# Patient Record
Sex: FEMALE | Race: WHITE | NOT HISPANIC OR LATINO | Employment: OTHER | URBAN - METROPOLITAN AREA
[De-identification: names, ages, dates, MRNs, and addresses within clinical notes are randomized per-mention and may not be internally consistent; named-entity substitution may affect disease eponyms.]

---

## 2023-08-28 LAB
ALANINE AMINOTRANSFERASE (SGPT) (U/L) IN SER/PLAS: 13 U/L (ref 7–45)
ALBUMIN (G/DL) IN SER/PLAS: 4.3 G/DL (ref 3.4–5)
ALKALINE PHOSPHATASE (U/L) IN SER/PLAS: 103 U/L (ref 33–136)
ANION GAP IN SER/PLAS: 13 MMOL/L (ref 10–20)
ASPARTATE AMINOTRANSFERASE (SGOT) (U/L) IN SER/PLAS: 19 U/L (ref 9–39)
BASOPHILS (10*3/UL) IN BLOOD BY AUTOMATED COUNT: 0.05 X10E9/L (ref 0–0.1)
BASOPHILS/100 LEUKOCYTES IN BLOOD BY AUTOMATED COUNT: 0.9 % (ref 0–2)
BILIRUBIN TOTAL (MG/DL) IN SER/PLAS: 0.6 MG/DL (ref 0–1.2)
CALCIDIOL (25 OH VITAMIN D3) (NG/ML) IN SER/PLAS: 34 NG/ML
CALCIUM (MG/DL) IN SER/PLAS: 9.5 MG/DL (ref 8.6–10.6)
CARBON DIOXIDE, TOTAL (MMOL/L) IN SER/PLAS: 26 MMOL/L (ref 21–32)
CHLORIDE (MMOL/L) IN SER/PLAS: 105 MMOL/L (ref 98–107)
CHOLESTEROL (MG/DL) IN SER/PLAS: 160 MG/DL (ref 0–199)
CHOLESTEROL IN HDL (MG/DL) IN SER/PLAS: 53.7 MG/DL
CHOLESTEROL/HDL RATIO: 3
CREATININE (MG/DL) IN SER/PLAS: 0.71 MG/DL (ref 0.5–1.05)
EOSINOPHILS (10*3/UL) IN BLOOD BY AUTOMATED COUNT: 0.24 X10E9/L (ref 0–0.4)
EOSINOPHILS/100 LEUKOCYTES IN BLOOD BY AUTOMATED COUNT: 4.2 % (ref 0–6)
ERYTHROCYTE DISTRIBUTION WIDTH (RATIO) BY AUTOMATED COUNT: 12.1 % (ref 11.5–14.5)
ERYTHROCYTE MEAN CORPUSCULAR HEMOGLOBIN CONCENTRATION (G/DL) BY AUTOMATED: 32.1 G/DL (ref 32–36)
ERYTHROCYTE MEAN CORPUSCULAR VOLUME (FL) BY AUTOMATED COUNT: 99 FL (ref 80–100)
ERYTHROCYTES (10*6/UL) IN BLOOD BY AUTOMATED COUNT: 4.49 X10E12/L (ref 4–5.2)
ESTIMATED AVERAGE GLUCOSE FOR HBA1C: 123 MG/DL
GFR FEMALE: 84 ML/MIN/1.73M2
GLUCOSE (MG/DL) IN SER/PLAS: 100 MG/DL (ref 74–99)
HEMATOCRIT (%) IN BLOOD BY AUTOMATED COUNT: 44.6 % (ref 36–46)
HEMOGLOBIN (G/DL) IN BLOOD: 14.3 G/DL (ref 12–16)
HEMOGLOBIN A1C/HEMOGLOBIN TOTAL IN BLOOD: 5.9 %
IMMATURE GRANULOCYTES/100 LEUKOCYTES IN BLOOD BY AUTOMATED COUNT: 0.2 % (ref 0–0.9)
LDL: 87 MG/DL (ref 0–99)
LEUKOCYTES (10*3/UL) IN BLOOD BY AUTOMATED COUNT: 5.8 X10E9/L (ref 4.4–11.3)
LYMPHOCYTES (10*3/UL) IN BLOOD BY AUTOMATED COUNT: 1.48 X10E9/L (ref 0.8–3)
LYMPHOCYTES/100 LEUKOCYTES IN BLOOD BY AUTOMATED COUNT: 25.6 % (ref 13–44)
MONOCYTES (10*3/UL) IN BLOOD BY AUTOMATED COUNT: 0.49 X10E9/L (ref 0.05–0.8)
MONOCYTES/100 LEUKOCYTES IN BLOOD BY AUTOMATED COUNT: 8.5 % (ref 2–10)
NEUTROPHILS (10*3/UL) IN BLOOD BY AUTOMATED COUNT: 3.5 X10E9/L (ref 1.6–5.5)
NEUTROPHILS/100 LEUKOCYTES IN BLOOD BY AUTOMATED COUNT: 60.6 % (ref 40–80)
NRBC (PER 100 WBCS) BY AUTOMATED COUNT: 0 /100 WBC (ref 0–0)
PLATELETS (10*3/UL) IN BLOOD AUTOMATED COUNT: 173 X10E9/L (ref 150–450)
POTASSIUM (MMOL/L) IN SER/PLAS: 4.3 MMOL/L (ref 3.5–5.3)
PROTEIN TOTAL: 7 G/DL (ref 6.4–8.2)
SODIUM (MMOL/L) IN SER/PLAS: 140 MMOL/L (ref 136–145)
THYROTROPIN (MIU/L) IN SER/PLAS BY DETECTION LIMIT <= 0.05 MIU/L: 1.26 MIU/L (ref 0.44–3.98)
THYROXINE (T4) FREE (NG/DL) IN SER/PLAS: 1.08 NG/DL (ref 0.78–1.48)
TRIGLYCERIDE (MG/DL) IN SER/PLAS: 95 MG/DL (ref 0–149)
UREA NITROGEN (MG/DL) IN SER/PLAS: 18 MG/DL (ref 6–23)
VLDL: 19 MG/DL (ref 0–40)

## 2023-10-09 DIAGNOSIS — H60.543 ECZEMA OF BOTH EXTERNAL EARS: Primary | ICD-10-CM

## 2023-10-09 PROBLEM — D48.5 NEOPLASM OF UNCERTAIN BEHAVIOR OF SKIN: Status: RESOLVED | Noted: 2022-12-22 | Resolved: 2023-10-09

## 2023-10-09 PROBLEM — L98.9 SKIN LESION, SUPERFICIAL: Status: RESOLVED | Noted: 2023-10-09 | Resolved: 2023-10-09

## 2023-10-09 PROBLEM — H52.03 HYPEROPIA OF BOTH EYES WITH ASTIGMATISM AND PRESBYOPIA: Status: ACTIVE | Noted: 2023-10-09

## 2023-10-09 PROBLEM — G89.29 CHRONIC BILATERAL LOW BACK PAIN WITHOUT SCIATICA: Status: ACTIVE | Noted: 2023-10-09

## 2023-10-09 PROBLEM — M19.042 PRIMARY OSTEOARTHRITIS OF BOTH HANDS: Status: ACTIVE | Noted: 2023-10-09

## 2023-10-09 PROBLEM — E78.00 HYPERCHOLESTEREMIA: Status: ACTIVE | Noted: 2023-10-09

## 2023-10-09 PROBLEM — M54.50 CHRONIC BILATERAL LOW BACK PAIN WITHOUT SCIATICA: Status: ACTIVE | Noted: 2023-10-09

## 2023-10-09 PROBLEM — R63.4 WEIGHT LOSS: Status: ACTIVE | Noted: 2023-10-09

## 2023-10-09 PROBLEM — S99.911A INJURY OF RIGHT ANKLE AND FOOT: Status: RESOLVED | Noted: 2023-10-09 | Resolved: 2023-10-09

## 2023-10-09 PROBLEM — E53.8 VITAMIN B 12 DEFICIENCY: Status: ACTIVE | Noted: 2023-10-09

## 2023-10-09 PROBLEM — R79.89 ABNORMAL LFTS (LIVER FUNCTION TESTS): Status: RESOLVED | Noted: 2023-10-09 | Resolved: 2023-10-09

## 2023-10-09 PROBLEM — H93.12 TINNITUS OF LEFT EAR: Status: RESOLVED | Noted: 2023-10-09 | Resolved: 2023-10-09

## 2023-10-09 PROBLEM — H61.23 BILATERAL IMPACTED CERUMEN: Status: RESOLVED | Noted: 2021-05-18 | Resolved: 2023-10-09

## 2023-10-09 PROBLEM — S89.90XA KNEE INJURY: Status: RESOLVED | Noted: 2023-10-09 | Resolved: 2023-10-09

## 2023-10-09 PROBLEM — L85.3 XEROSIS CUTIS: Status: ACTIVE | Noted: 2022-12-22

## 2023-10-09 PROBLEM — H52.203 HYPEROPIA OF BOTH EYES WITH ASTIGMATISM AND PRESBYOPIA: Status: ACTIVE | Noted: 2023-10-09

## 2023-10-09 PROBLEM — S92.244A CLOSED NONDISPLACED FRACTURE OF MEDIAL CUNEIFORM OF RIGHT FOOT: Status: RESOLVED | Noted: 2023-10-09 | Resolved: 2023-10-09

## 2023-10-09 PROBLEM — H81.13 BENIGN PAROXYSMAL POSITIONAL VERTIGO DUE TO BILATERAL VESTIBULAR DISORDER: Status: ACTIVE | Noted: 2023-10-09

## 2023-10-09 PROBLEM — S82.143A FRACTURE OF TIBIAL PLATEAU: Status: RESOLVED | Noted: 2023-10-09 | Resolved: 2023-10-09

## 2023-10-09 PROBLEM — E55.9 VITAMIN D DEFICIENCY: Status: ACTIVE | Noted: 2023-10-09

## 2023-10-09 PROBLEM — D22.4 MELANOCYTIC NEVI OF SCALP AND NECK: Status: ACTIVE | Noted: 2022-12-22

## 2023-10-09 PROBLEM — H62.40 FUNGAL OTITIS EXTERNA: Status: RESOLVED | Noted: 2023-10-09 | Resolved: 2023-10-09

## 2023-10-09 PROBLEM — H60.549 ECZEMA OF EXTERNAL EAR: Status: ACTIVE | Noted: 2023-10-09

## 2023-10-09 PROBLEM — S99.921A INJURY OF RIGHT ANKLE AND FOOT: Status: RESOLVED | Noted: 2023-10-09 | Resolved: 2023-10-09

## 2023-10-09 PROBLEM — H93.A2 SUBJECTIVE PULSATILE TINNITUS OF LEFT EAR: Status: ACTIVE | Noted: 2023-10-09

## 2023-10-09 PROBLEM — L57.0 ACTINIC KERATOSIS: Status: ACTIVE | Noted: 2022-12-22

## 2023-10-09 PROBLEM — B18.2 HEP C W/O COMA, CHRONIC (MULTI): Status: ACTIVE | Noted: 2023-10-09

## 2023-10-09 PROBLEM — M19.90 OSTEOARTHRITIS: Status: ACTIVE | Noted: 2023-10-09

## 2023-10-09 PROBLEM — R73.09 ABNORMAL GLUCOSE: Status: RESOLVED | Noted: 2023-10-09 | Resolved: 2023-10-09

## 2023-10-09 PROBLEM — B36.9 FUNGAL OTITIS EXTERNA: Status: RESOLVED | Noted: 2023-10-09 | Resolved: 2023-10-09

## 2023-10-09 PROBLEM — S93.401A SPRAIN OF RIGHT ANKLE: Status: RESOLVED | Noted: 2023-10-09 | Resolved: 2023-10-09

## 2023-10-09 PROBLEM — R42 DIZZINESS: Status: RESOLVED | Noted: 2023-10-09 | Resolved: 2023-10-09

## 2023-10-09 PROBLEM — L81.4 OTHER MELANIN HYPERPIGMENTATION: Status: ACTIVE | Noted: 2022-12-22

## 2023-10-09 PROBLEM — M15.9 GENERALIZED OSTEOARTHRITIS OF MULTIPLE SITES: Status: ACTIVE | Noted: 2023-10-09

## 2023-10-09 PROBLEM — M81.0 OSTEOPOROSIS: Status: ACTIVE | Noted: 2023-10-09

## 2023-10-09 PROBLEM — H25.12 AGE-RELATED NUCLEAR CATARACT OF LEFT EYE: Status: ACTIVE | Noted: 2023-10-09

## 2023-10-09 PROBLEM — H60.92 LEFT OTITIS EXTERNA: Status: RESOLVED | Noted: 2023-10-09 | Resolved: 2023-10-09

## 2023-10-09 PROBLEM — M19.041 PRIMARY OSTEOARTHRITIS OF BOTH HANDS: Status: ACTIVE | Noted: 2023-10-09

## 2023-10-09 PROBLEM — H52.4 HYPEROPIA OF BOTH EYES WITH ASTIGMATISM AND PRESBYOPIA: Status: ACTIVE | Noted: 2023-10-09

## 2023-10-09 PROBLEM — M16.0 PRIMARY OSTEOARTHRITIS OF BOTH HIPS: Status: ACTIVE | Noted: 2023-10-09

## 2023-10-09 PROBLEM — R53.83 FATIGUE: Status: RESOLVED | Noted: 2023-10-09 | Resolved: 2023-10-09

## 2023-10-09 PROBLEM — H90.3 ASYMMETRICAL SENSORINEURAL HEARING LOSS: Status: ACTIVE | Noted: 2021-05-18

## 2023-10-09 PROBLEM — H61.20 WAX IN EAR: Status: RESOLVED | Noted: 2023-10-09 | Resolved: 2023-10-09

## 2023-10-09 RX ORDER — ACETAMINOPHEN 500 MG
50 TABLET ORAL DAILY
COMMUNITY
Start: 2020-10-06

## 2023-10-09 RX ORDER — BOSWELLIA SERRATA EXTRACT 70 %
POWDER (GRAM) MISCELLANEOUS
COMMUNITY
Start: 2020-10-06

## 2023-10-09 RX ORDER — CLOBETASOL PROPIONATE 0.5 MG/G
CREAM TOPICAL 3 TIMES DAILY
COMMUNITY
Start: 2017-08-29 | End: 2023-10-09 | Stop reason: SDUPTHER

## 2023-10-09 RX ORDER — MOMETASONE FUROATE 1 MG/G
OINTMENT TOPICAL DAILY
COMMUNITY
Start: 2020-01-09

## 2023-10-09 RX ORDER — CLOBETASOL PROPIONATE 0.5 MG/G
CREAM TOPICAL 2 TIMES DAILY
Qty: 15 G | Refills: 3 | Status: SHIPPED | OUTPATIENT
Start: 2023-10-09 | End: 2024-01-07

## 2024-02-22 ENCOUNTER — APPOINTMENT (OUTPATIENT)
Dept: DERMATOLOGY | Facility: CLINIC | Age: 84
End: 2024-02-22
Payer: MEDICARE

## 2024-09-03 ENCOUNTER — TELEPHONE (OUTPATIENT)
Dept: PRIMARY CARE | Facility: CLINIC | Age: 84
End: 2024-09-03
Payer: MEDICARE

## 2024-09-03 DIAGNOSIS — E78.00 HYPERCHOLESTEREMIA: Primary | ICD-10-CM

## 2024-09-03 DIAGNOSIS — R53.83 FATIGUE, UNSPECIFIED TYPE: ICD-10-CM

## 2024-09-03 DIAGNOSIS — E55.9 VITAMIN D DEFICIENCY: ICD-10-CM

## 2024-09-12 DIAGNOSIS — D64.9 ANEMIA, UNSPECIFIED TYPE: Primary | ICD-10-CM

## 2024-09-17 ENCOUNTER — LAB (OUTPATIENT)
Dept: LAB | Facility: LAB | Age: 84
End: 2024-09-17
Payer: MEDICARE

## 2024-09-17 DIAGNOSIS — R53.83 FATIGUE, UNSPECIFIED TYPE: ICD-10-CM

## 2024-09-17 DIAGNOSIS — E55.9 VITAMIN D DEFICIENCY: ICD-10-CM

## 2024-09-17 DIAGNOSIS — D64.9 ANEMIA, UNSPECIFIED TYPE: ICD-10-CM

## 2024-09-17 DIAGNOSIS — E78.00 HYPERCHOLESTEREMIA: ICD-10-CM

## 2024-09-17 LAB
25(OH)D3 SERPL-MCNC: 38 NG/ML (ref 30–100)
ALBUMIN SERPL BCP-MCNC: 4 G/DL (ref 3.4–5)
ALP SERPL-CCNC: 100 U/L (ref 33–136)
ALT SERPL W P-5'-P-CCNC: 12 U/L (ref 7–45)
ANION GAP SERPL CALC-SCNC: 12 MMOL/L (ref 10–20)
AST SERPL W P-5'-P-CCNC: 20 U/L (ref 9–39)
BASOPHILS # BLD AUTO: 0.06 X10*3/UL (ref 0–0.1)
BASOPHILS NFR BLD AUTO: 1 %
BILIRUB SERPL-MCNC: 0.5 MG/DL (ref 0–1.2)
BUN SERPL-MCNC: 18 MG/DL (ref 6–23)
CALCIUM SERPL-MCNC: 9.3 MG/DL (ref 8.6–10.6)
CHLORIDE SERPL-SCNC: 102 MMOL/L (ref 98–107)
CHOLEST SERPL-MCNC: 157 MG/DL (ref 0–199)
CHOLESTEROL/HDL RATIO: 2.5
CO2 SERPL-SCNC: 29 MMOL/L (ref 21–32)
CREAT SERPL-MCNC: 0.8 MG/DL (ref 0.5–1.05)
EGFRCR SERPLBLD CKD-EPI 2021: 73 ML/MIN/1.73M*2
EOSINOPHIL # BLD AUTO: 0.36 X10*3/UL (ref 0–0.4)
EOSINOPHIL NFR BLD AUTO: 5.9 %
ERYTHROCYTE [DISTWIDTH] IN BLOOD BY AUTOMATED COUNT: 12.2 % (ref 11.5–14.5)
GLUCOSE SERPL-MCNC: 96 MG/DL (ref 74–99)
HCT VFR BLD AUTO: 41.3 % (ref 36–46)
HDLC SERPL-MCNC: 63.1 MG/DL
HGB BLD-MCNC: 13.6 G/DL (ref 12–16)
IMM GRANULOCYTES # BLD AUTO: 0.01 X10*3/UL (ref 0–0.5)
IMM GRANULOCYTES NFR BLD AUTO: 0.2 % (ref 0–0.9)
LDLC SERPL CALC-MCNC: 83 MG/DL
LYMPHOCYTES # BLD AUTO: 1.33 X10*3/UL (ref 0.8–3)
LYMPHOCYTES NFR BLD AUTO: 21.9 %
MCH RBC QN AUTO: 32.2 PG (ref 26–34)
MCHC RBC AUTO-ENTMCNC: 32.9 G/DL (ref 32–36)
MCV RBC AUTO: 98 FL (ref 80–100)
MONOCYTES # BLD AUTO: 0.6 X10*3/UL (ref 0.05–0.8)
MONOCYTES NFR BLD AUTO: 9.9 %
NEUTROPHILS # BLD AUTO: 3.7 X10*3/UL (ref 1.6–5.5)
NEUTROPHILS NFR BLD AUTO: 61.1 %
NON HDL CHOLESTEROL: 94 MG/DL (ref 0–149)
NRBC BLD-RTO: 0 /100 WBCS (ref 0–0)
PLATELET # BLD AUTO: 190 X10*3/UL (ref 150–450)
POTASSIUM SERPL-SCNC: 4.4 MMOL/L (ref 3.5–5.3)
PROT SERPL-MCNC: 6.8 G/DL (ref 6.4–8.2)
RBC # BLD AUTO: 4.22 X10*6/UL (ref 4–5.2)
SODIUM SERPL-SCNC: 139 MMOL/L (ref 136–145)
TRIGL SERPL-MCNC: 56 MG/DL (ref 0–149)
VIT B12 SERPL-MCNC: 503 PG/ML (ref 211–911)
VLDL: 11 MG/DL (ref 0–40)
WBC # BLD AUTO: 6.1 X10*3/UL (ref 4.4–11.3)

## 2024-09-17 PROCEDURE — 85025 COMPLETE CBC W/AUTO DIFF WBC: CPT

## 2024-09-17 PROCEDURE — 82306 VITAMIN D 25 HYDROXY: CPT

## 2024-09-17 PROCEDURE — 36415 COLL VENOUS BLD VENIPUNCTURE: CPT

## 2024-09-17 PROCEDURE — 82607 VITAMIN B-12: CPT

## 2024-09-17 PROCEDURE — 80053 COMPREHEN METABOLIC PANEL: CPT

## 2024-09-17 PROCEDURE — 80061 LIPID PANEL: CPT

## 2024-09-30 ENCOUNTER — APPOINTMENT (OUTPATIENT)
Dept: PRIMARY CARE | Facility: CLINIC | Age: 84
End: 2024-09-30
Payer: MEDICARE

## 2024-09-30 VITALS
BODY MASS INDEX: 17.61 KG/M2 | WEIGHT: 109.57 LBS | HEART RATE: 63 BPM | HEIGHT: 66 IN | TEMPERATURE: 97.3 F | DIASTOLIC BLOOD PRESSURE: 71 MMHG | SYSTOLIC BLOOD PRESSURE: 126 MMHG | OXYGEN SATURATION: 98 % | RESPIRATION RATE: 16 BRPM

## 2024-09-30 DIAGNOSIS — Z00.00 MEDICARE ANNUAL WELLNESS VISIT, SUBSEQUENT: Primary | ICD-10-CM

## 2024-09-30 DIAGNOSIS — H61.23 EXCESSIVE EAR WAX, BILATERAL: ICD-10-CM

## 2024-09-30 DIAGNOSIS — Z23 NEED FOR IMMUNIZATION AGAINST INFLUENZA: ICD-10-CM

## 2024-09-30 PROCEDURE — 1126F AMNT PAIN NOTED NONE PRSNT: CPT | Performed by: INTERNAL MEDICINE

## 2024-09-30 PROCEDURE — 1036F TOBACCO NON-USER: CPT | Performed by: INTERNAL MEDICINE

## 2024-09-30 PROCEDURE — 1158F ADVNC CARE PLAN TLK DOCD: CPT | Performed by: INTERNAL MEDICINE

## 2024-09-30 PROCEDURE — 1157F ADVNC CARE PLAN IN RCRD: CPT | Performed by: INTERNAL MEDICINE

## 2024-09-30 PROCEDURE — 1160F RVW MEDS BY RX/DR IN RCRD: CPT | Performed by: INTERNAL MEDICINE

## 2024-09-30 PROCEDURE — 90662 IIV NO PRSV INCREASED AG IM: CPT | Performed by: INTERNAL MEDICINE

## 2024-09-30 PROCEDURE — 1159F MED LIST DOCD IN RCRD: CPT | Performed by: INTERNAL MEDICINE

## 2024-09-30 PROCEDURE — G0439 PPPS, SUBSEQ VISIT: HCPCS | Performed by: INTERNAL MEDICINE

## 2024-09-30 PROCEDURE — G0008 ADMIN INFLUENZA VIRUS VAC: HCPCS | Performed by: INTERNAL MEDICINE

## 2024-09-30 PROCEDURE — 1123F ACP DISCUSS/DSCN MKR DOCD: CPT | Performed by: INTERNAL MEDICINE

## 2024-09-30 PROCEDURE — 1170F FXNL STATUS ASSESSED: CPT | Performed by: INTERNAL MEDICINE

## 2024-09-30 ASSESSMENT — ACTIVITIES OF DAILY LIVING (ADL)
DRESSING: INDEPENDENT
BATHING: INDEPENDENT
DOING_HOUSEWORK: INDEPENDENT
MANAGING_FINANCES: INDEPENDENT
TAKING_MEDICATION: INDEPENDENT
GROCERY_SHOPPING: INDEPENDENT

## 2024-09-30 ASSESSMENT — PAIN SCALES - GENERAL: PAINLEVEL: 0-NO PAIN

## 2024-09-30 ASSESSMENT — PATIENT HEALTH QUESTIONNAIRE - PHQ9
1. LITTLE INTEREST OR PLEASURE IN DOING THINGS: NOT AT ALL
2. FEELING DOWN, DEPRESSED OR HOPELESS: NOT AT ALL
SUM OF ALL RESPONSES TO PHQ9 QUESTIONS 1 AND 2: 0

## 2024-09-30 ASSESSMENT — ENCOUNTER SYMPTOMS
ARTHRALGIAS: 1
OCCASIONAL FEELINGS OF UNSTEADINESS: 0
DEPRESSION: 0
LOSS OF SENSATION IN FEET: 1

## 2024-09-30 NOTE — ASSESSMENT & PLAN NOTE
After instilling hydrogen peroxide in both ears they were lavaged with warm water and wax was removed with the help of curette.

## 2024-09-30 NOTE — PROGRESS NOTES
"Subjective   Patient ID: Jessica Hensley is a 84 y.o. female who presents for Medicare Annual Wellness Visit Subsequent.    Subsequent Medicare wellness visit.  She feels well has no complaints,  except occasional joints pain.  She gained 9 pounds since last year.  She is vegan.  She takes no prescription medications.  She has had recent blood work results were reviewed.             Review of Systems   Musculoskeletal:  Positive for arthralgias.   All other systems reviewed and are negative.      Objective   /71 (BP Location: Left arm, Patient Position: Sitting)   Pulse 63   Temp 36.3 °C (97.3 °F) (Temporal)   Resp 16   Ht 1.665 m (5' 5.55\")   Wt 49.7 kg (109 lb 9.1 oz)   SpO2 98%   BMI 17.93 kg/m²     Physical Exam  Constitutional:       Appearance: Normal appearance.      Comments: Underweight.   HENT:      Head: Normocephalic and atraumatic.      Comments: Bilateral ears wax     Right Ear: External ear normal.      Left Ear: External ear normal.      Mouth/Throat:      Mouth: Mucous membranes are moist.      Pharynx: Oropharynx is clear.   Neck:      Vascular: No carotid bruit.   Cardiovascular:      Rate and Rhythm: Normal rate and regular rhythm.      Heart sounds: No murmur heard.     No gallop.   Pulmonary:      Effort: Pulmonary effort is normal. No respiratory distress.      Breath sounds: No wheezing or rales.   Abdominal:      General: Abdomen is flat.      Palpations: Abdomen is soft.      Hernia: No hernia is present.   Musculoskeletal:         General: No swelling, tenderness, deformity or signs of injury.      Cervical back: No rigidity or tenderness.      Right lower leg: No edema.   Lymphadenopathy:      Cervical: No cervical adenopathy.   Skin:     Coloration: Skin is not jaundiced or pale.      Findings: Lesion present. No bruising, erythema or rash.      Comments: Rt. Hand skin lesion   Neurological:      General: No focal deficit present.      Mental Status: She is oriented to " person, place, and time.      Motor: No weakness.      Coordination: Coordination normal.   Psychiatric:         Mood and Affect: Mood normal.         Behavior: Behavior normal.         Assessment/Plan   Problem List Items Addressed This Visit             ICD-10-CM    Excessive ear wax, bilateral H61.23     After instilling hydrogen peroxide in both ears they were lavaged with warm water and wax was removed with the help of curette.         Relevant Orders    Ear Cerumen Removal    Medicare annual wellness visit, subsequent - Primary Z00.00     She received high-dose influenza vaccine today.  Recommend to have Shingrix vaccine at the local pharmacy.  She has had last colonoscopy in 2018, Cologuard test 2023 was normal.  Has had fasting blood work results reviewed , normal.  Continue healthy diet exercise regularly.          Other Visit Diagnoses         Codes    Need for immunization against influenza     Z23    Relevant Orders    Flu vaccine, trivalent, preservative free, HIGH-DOSE, age 65y+ (Fluzone)

## 2024-09-30 NOTE — ASSESSMENT & PLAN NOTE
She received high-dose influenza vaccine today.  Recommend to have Shingrix vaccine at the local pharmacy.  She has had last colonoscopy in 2018, Cologuard test 2023 was normal.  Has had fasting blood work results reviewed , normal.  Continue healthy diet exercise regularly.

## 2024-10-01 PROBLEM — B18.2 HEP C W/O COMA, CHRONIC (MULTI): Status: RESOLVED | Noted: 2023-10-09 | Resolved: 2024-10-01

## 2024-10-01 PROBLEM — R63.4 WEIGHT LOSS: Status: RESOLVED | Noted: 2023-10-09 | Resolved: 2024-10-01

## 2024-10-01 PROBLEM — M16.0 PRIMARY OSTEOARTHRITIS OF BOTH HIPS: Status: RESOLVED | Noted: 2023-10-09 | Resolved: 2024-10-01

## 2024-10-01 PROBLEM — E55.9 VITAMIN D DEFICIENCY: Status: RESOLVED | Noted: 2023-10-09 | Resolved: 2024-10-01

## 2024-10-01 PROBLEM — H81.13 BENIGN PAROXYSMAL POSITIONAL VERTIGO DUE TO BILATERAL VESTIBULAR DISORDER: Status: RESOLVED | Noted: 2023-10-09 | Resolved: 2024-10-01

## 2024-10-01 PROBLEM — H52.4 HYPEROPIA OF BOTH EYES WITH ASTIGMATISM AND PRESBYOPIA: Status: RESOLVED | Noted: 2023-10-09 | Resolved: 2024-10-01

## 2024-10-01 PROBLEM — M54.50 CHRONIC BILATERAL LOW BACK PAIN WITHOUT SCIATICA: Status: RESOLVED | Noted: 2023-10-09 | Resolved: 2024-10-01

## 2024-10-01 PROBLEM — H25.12 AGE-RELATED NUCLEAR CATARACT OF LEFT EYE: Status: RESOLVED | Noted: 2023-10-09 | Resolved: 2024-10-01

## 2024-10-01 PROBLEM — Z86.19 HISTORY OF HEPATITIS C VIRUS INFECTION: Status: RESOLVED | Noted: 2024-10-01 | Resolved: 2024-10-01

## 2024-10-01 PROBLEM — E78.00 HYPERCHOLESTEREMIA: Status: RESOLVED | Noted: 2023-10-09 | Resolved: 2024-10-01

## 2024-10-01 PROBLEM — R73.03 PREDIABETES: Status: ACTIVE | Noted: 2023-05-24

## 2024-10-01 PROBLEM — H52.03 HYPEROPIA OF BOTH EYES WITH ASTIGMATISM AND PRESBYOPIA: Status: RESOLVED | Noted: 2023-10-09 | Resolved: 2024-10-01

## 2024-10-01 PROBLEM — D22.4 MELANOCYTIC NEVI OF SCALP AND NECK: Status: RESOLVED | Noted: 2022-12-22 | Resolved: 2024-10-01

## 2024-10-01 PROBLEM — E53.8 VITAMIN B 12 DEFICIENCY: Status: RESOLVED | Noted: 2023-10-09 | Resolved: 2024-10-01

## 2024-10-01 PROBLEM — H61.23 EXCESSIVE EAR WAX, BILATERAL: Status: RESOLVED | Noted: 2023-10-09 | Resolved: 2024-10-01

## 2024-10-01 PROBLEM — G62.9 NEUROPATHY: Status: ACTIVE | Noted: 2024-10-01

## 2024-10-01 PROBLEM — L81.9 PIGMENTED SKIN LESION: Status: ACTIVE | Noted: 2024-10-01

## 2024-10-01 PROBLEM — H52.203 HYPEROPIA OF BOTH EYES WITH ASTIGMATISM AND PRESBYOPIA: Status: RESOLVED | Noted: 2023-10-09 | Resolved: 2024-10-01

## 2024-10-01 PROBLEM — G89.29 CHRONIC BILATERAL LOW BACK PAIN WITHOUT SCIATICA: Status: RESOLVED | Noted: 2023-10-09 | Resolved: 2024-10-01

## 2025-03-13 ENCOUNTER — APPOINTMENT (OUTPATIENT)
Dept: DERMATOLOGY | Facility: CLINIC | Age: 85
End: 2025-03-13
Payer: MEDICARE

## 2025-03-13 ASSESSMENT — DERMATOLOGY QUALITY OF LIFE (QOL) ASSESSMENT
RATE HOW EMOTIONALLY BOTHERED YOU ARE BY YOUR SKIN PROBLEM (FOR EXAMPLE, WORRY, EMBARRASSMENT, FRUSTRATION): 0 - NEVER BOTHERED
RATE HOW BOTHERED YOU ARE BY EFFECTS OF YOUR SKIN PROBLEMS ON YOUR ACTIVITIES (EG, GOING OUT, ACCOMPLISHING WHAT YOU WANT, WORK ACTIVITIES OR YOUR RELATIONSHIPS WITH OTHERS): 0 - NEVER BOTHERED
RATE HOW EMOTIONALLY BOTHERED YOU ARE BY YOUR SKIN PROBLEM (FOR EXAMPLE, WORRY, EMBARRASSMENT, FRUSTRATION): 0 - NEVER BOTHERED
RATE HOW BOTHERED YOU ARE BY EFFECTS OF YOUR SKIN PROBLEMS ON YOUR ACTIVITIES (EG, GOING OUT, ACCOMPLISHING WHAT YOU WANT, WORK ACTIVITIES OR YOUR RELATIONSHIPS WITH OTHERS): 0 - NEVER BOTHERED
RATE HOW BOTHERED YOU ARE BY SYMPTOMS OF YOUR SKIN PROBLEM (EG, ITCHING, STINGING BURNING, HURTING OR SKIN IRRITATION): 3
RATE HOW BOTHERED YOU ARE BY SYMPTOMS OF YOUR SKIN PROBLEM (EG, ITCHING, STINGING BURNING, HURTING OR SKIN IRRITATION): 3

## 2025-03-26 ENCOUNTER — TELEPHONE (OUTPATIENT)
Dept: PRIMARY CARE | Facility: CLINIC | Age: 85
End: 2025-03-26

## 2025-03-27 ENCOUNTER — OFFICE VISIT (OUTPATIENT)
Dept: PRIMARY CARE | Facility: CLINIC | Age: 85
End: 2025-03-27
Payer: COMMERCIAL

## 2025-03-27 VITALS
SYSTOLIC BLOOD PRESSURE: 125 MMHG | BODY MASS INDEX: 18.22 KG/M2 | HEART RATE: 72 BPM | WEIGHT: 111.33 LBS | OXYGEN SATURATION: 96 % | DIASTOLIC BLOOD PRESSURE: 63 MMHG | RESPIRATION RATE: 16 BRPM

## 2025-03-27 DIAGNOSIS — M25.50 PAIN IN JOINTS: ICD-10-CM

## 2025-03-27 DIAGNOSIS — R73.03 PREDIABETES: ICD-10-CM

## 2025-03-27 DIAGNOSIS — M79.675 CHRONIC PAIN OF TOES OF BOTH FEET: Primary | ICD-10-CM

## 2025-03-27 DIAGNOSIS — M79.674 CHRONIC PAIN OF TOES OF BOTH FEET: Primary | ICD-10-CM

## 2025-03-27 DIAGNOSIS — I73.9 CLAUDICATION: ICD-10-CM

## 2025-03-27 DIAGNOSIS — G89.29 CHRONIC PAIN OF TOES OF BOTH FEET: Primary | ICD-10-CM

## 2025-03-27 PROCEDURE — 99213 OFFICE O/P EST LOW 20 MIN: CPT | Performed by: INTERNAL MEDICINE

## 2025-03-27 PROCEDURE — 1125F AMNT PAIN NOTED PAIN PRSNT: CPT | Performed by: INTERNAL MEDICINE

## 2025-03-27 PROCEDURE — G2211 COMPLEX E/M VISIT ADD ON: HCPCS | Performed by: INTERNAL MEDICINE

## 2025-03-27 PROCEDURE — 1036F TOBACCO NON-USER: CPT | Performed by: INTERNAL MEDICINE

## 2025-03-27 PROCEDURE — 1159F MED LIST DOCD IN RCRD: CPT | Performed by: INTERNAL MEDICINE

## 2025-03-27 PROCEDURE — 1123F ACP DISCUSS/DSCN MKR DOCD: CPT | Performed by: INTERNAL MEDICINE

## 2025-03-27 PROCEDURE — 1157F ADVNC CARE PLAN IN RCRD: CPT | Performed by: INTERNAL MEDICINE

## 2025-03-27 RX ORDER — MOMETASONE FUROATE 1 MG/G
OINTMENT TOPICAL
COMMUNITY

## 2025-03-27 RX ORDER — CLOBETASOL PROPIONATE 0.5 MG/G
CREAM TOPICAL
COMMUNITY

## 2025-03-27 ASSESSMENT — ENCOUNTER SYMPTOMS
DEPRESSION: 0
LOSS OF SENSATION IN FEET: 1
OCCASIONAL FEELINGS OF UNSTEADINESS: 0

## 2025-03-27 ASSESSMENT — PAIN SCALES - GENERAL: PAINLEVEL_OUTOF10: 3

## 2025-03-27 NOTE — ASSESSMENT & PLAN NOTE
Recommend to see podiatrist in consult to recommend optimal shoes and inserts.  Treating calluses.

## 2025-03-27 NOTE — PROGRESS NOTES
Subjective   Patient ID: Jessica Hensley is a 85 y.o. female who presents for Foot Pain (Bilateral/Redness and warmth).    Same-day appointment.  Complains of bilateral feet pain, turning purple, painful calluses, toe deformities.   Sometimes calves  when walking.         Review of Systems   Musculoskeletal:         Feet problems   All other systems reviewed and are negative.      Objective   /63 (BP Location: Left arm, Patient Position: Sitting)   Pulse 72   Resp 16   Wt 50.5 kg (111 lb 5.3 oz)   SpO2 96%   BMI 18.22 kg/m²     Physical Exam  HENT:      Head: Normocephalic.   Eyes:      Extraocular Movements: Extraocular movements intact.   Cardiovascular:      Rate and Rhythm: Regular rhythm.      Pulses:           Dorsalis pedis pulses are 2+ on the right side and 2+ on the left side.        Posterior tibial pulses are 2+ on the right side and 2+ on the left side.   Pulmonary:      Breath sounds: Normal breath sounds.   Abdominal:      Palpations: Abdomen is soft.   Musculoskeletal:         General: Deformity present.      Cervical back: Normal range of motion.      Right foot: Deformity present.      Left foot: Deformity present.      Comments: Toe deformities hammertoes   Feet:      Right foot:      Protective Sensation:   10 sites sensed.      Skin integrity: Callus present.      Toenail Condition: Right toenails are abnormally thick.      Left foot:      Protective Sensation: 10 sites tested.  10 sites sensed.      Skin integrity: Callus present.      Toenail Condition: Left toenails are abnormally thick.   Neurological:      Mental Status: She is oriented to person, place, and time.         Assessment/Plan   Problem List Items Addressed This Visit             ICD-10-CM    Prediabetes R73.03    Relevant Orders    Comprehensive Metabolic Panel    Claudication (CMS-HCC) I73.9     Have PVR         Relevant Orders    Vascular US PVR without exercise    Chronic pain of toes of both feet - Primary  M79.674, M79.675, G89.29     Recommend to see podiatrist in consult to recommend optimal shoes and inserts.  Treating calluses.         Relevant Orders    Referral to Podiatry    C-Reactive Protein    Vascular US PVR without exercise     Other Visit Diagnoses         Codes    Pain in joints     M25.50    Relevant Orders    CHELLY with Reflex to DIANA

## 2025-03-28 LAB
ALBUMIN SERPL-MCNC: 4.2 G/DL (ref 3.6–5.1)
ALP SERPL-CCNC: 103 U/L (ref 37–153)
ALT SERPL-CCNC: 15 U/L (ref 6–29)
ANA SER QL IF: NORMAL
ANION GAP SERPL CALCULATED.4IONS-SCNC: 5 MMOL/L (CALC) (ref 7–17)
AST SERPL-CCNC: 23 U/L (ref 10–35)
BILIRUB SERPL-MCNC: 0.4 MG/DL (ref 0.2–1.2)
BUN SERPL-MCNC: 20 MG/DL (ref 7–25)
CALCIUM SERPL-MCNC: 9.2 MG/DL (ref 8.6–10.4)
CHLORIDE SERPL-SCNC: 102 MMOL/L (ref 98–110)
CO2 SERPL-SCNC: 31 MMOL/L (ref 20–32)
CREAT SERPL-MCNC: 0.98 MG/DL (ref 0.6–0.95)
CRP SERPL-MCNC: <3 MG/L
EGFRCR SERPLBLD CKD-EPI 2021: 57 ML/MIN/1.73M2
GLUCOSE SERPL-MCNC: 88 MG/DL (ref 65–139)
POTASSIUM SERPL-SCNC: 5.8 MMOL/L (ref 3.5–5.3)
PROT SERPL-MCNC: 6.6 G/DL (ref 6.1–8.1)
SODIUM SERPL-SCNC: 138 MMOL/L (ref 135–146)

## 2025-03-31 LAB
ALBUMIN SERPL-MCNC: 4.2 G/DL (ref 3.6–5.1)
ALP SERPL-CCNC: 103 U/L (ref 37–153)
ALT SERPL-CCNC: 15 U/L (ref 6–29)
ANA PAT SER IF-IMP: ABNORMAL
ANA PAT SER IF-IMP: ABNORMAL
ANA SER QL IF: POSITIVE
ANA TITR SER IF: ABNORMAL TITER
ANA TITR SER IF: ABNORMAL TITER
ANION GAP SERPL CALCULATED.4IONS-SCNC: 5 MMOL/L (CALC) (ref 7–17)
AST SERPL-CCNC: 23 U/L (ref 10–35)
BILIRUB SERPL-MCNC: 0.4 MG/DL (ref 0.2–1.2)
BUN SERPL-MCNC: 20 MG/DL (ref 7–25)
CALCIUM SERPL-MCNC: 9.2 MG/DL (ref 8.6–10.4)
CENTROMERE B AB SER-ACNC: ABNORMAL AI
CHLORIDE SERPL-SCNC: 102 MMOL/L (ref 98–110)
CO2 SERPL-SCNC: 31 MMOL/L (ref 20–32)
CREAT SERPL-MCNC: 0.98 MG/DL (ref 0.6–0.95)
CRP SERPL-MCNC: <3 MG/L
DSDNA AB SER-ACNC: <1 IU/ML
EGFRCR SERPLBLD CKD-EPI 2021: 57 ML/MIN/1.73M2
ENA JO1 AB SER IA-ACNC: ABNORMAL AI
ENA RNP AB SER-ACNC: ABNORMAL AI
ENA SCL70 AB SER IA-ACNC: ABNORMAL AI
ENA SM AB SER IA-ACNC: ABNORMAL AI
ENA SM+RNP AB SER IA-ACNC: ABNORMAL AI
ENA SS-A AB SER IA-ACNC: ABNORMAL AI
ENA SS-B AB SER IA-ACNC: ABNORMAL AI
GLUCOSE SERPL-MCNC: 88 MG/DL (ref 65–139)
LABORATORY COMMENT REPORT: ABNORMAL
NUCLEOSOME AB SER IA-ACNC: ABNORMAL AI
POTASSIUM SERPL-SCNC: 5.8 MMOL/L (ref 3.5–5.3)
PROT SERPL-MCNC: 6.6 G/DL (ref 6.1–8.1)
RIBOSOMAL P AB SER-ACNC: ABNORMAL AI
SODIUM SERPL-SCNC: 138 MMOL/L (ref 135–146)

## 2025-04-01 DIAGNOSIS — R76.8 ANTINUCLEAR ANTIBODY (ANA) POSITIVE: Primary | ICD-10-CM

## 2025-04-01 DIAGNOSIS — E87.5 HYPERKALEMIA: ICD-10-CM

## 2025-04-14 ENCOUNTER — HOSPITAL ENCOUNTER (OUTPATIENT)
Dept: VASCULAR MEDICINE | Facility: CLINIC | Age: 85
Discharge: HOME | End: 2025-04-14
Payer: COMMERCIAL

## 2025-04-14 DIAGNOSIS — M79.674 CHRONIC PAIN OF TOES OF BOTH FEET: ICD-10-CM

## 2025-04-14 DIAGNOSIS — I73.9 CLAUDICATION: ICD-10-CM

## 2025-04-14 DIAGNOSIS — M79.675 CHRONIC PAIN OF TOES OF BOTH FEET: ICD-10-CM

## 2025-04-14 DIAGNOSIS — G89.29 CHRONIC PAIN OF TOES OF BOTH FEET: ICD-10-CM

## 2025-04-14 PROCEDURE — 93922 UPR/L XTREMITY ART 2 LEVELS: CPT | Performed by: INTERNAL MEDICINE

## 2025-04-14 PROCEDURE — 93922 UPR/L XTREMITY ART 2 LEVELS: CPT

## 2025-05-02 LAB
ANION GAP SERPL CALCULATED.4IONS-SCNC: 7 MMOL/L (CALC) (ref 7–17)
BUN SERPL-MCNC: 18 MG/DL (ref 7–25)
BUN/CREAT SERPL: NORMAL (CALC) (ref 6–22)
CALCIUM SERPL-MCNC: 9.1 MG/DL (ref 8.6–10.4)
CHLORIDE SERPL-SCNC: 102 MMOL/L (ref 98–110)
CO2 SERPL-SCNC: 28 MMOL/L (ref 20–32)
CREAT SERPL-MCNC: 0.69 MG/DL (ref 0.6–0.95)
EGFRCR SERPLBLD CKD-EPI 2021: 85 ML/MIN/1.73M2
GLUCOSE SERPL-MCNC: 79 MG/DL (ref 65–99)
POTASSIUM SERPL-SCNC: 4.1 MMOL/L (ref 3.5–5.3)
SODIUM SERPL-SCNC: 137 MMOL/L (ref 135–146)

## 2025-06-19 ENCOUNTER — APPOINTMENT (OUTPATIENT)
Dept: RHEUMATOLOGY | Facility: CLINIC | Age: 85
End: 2025-06-19
Payer: COMMERCIAL

## 2025-06-19 VITALS
SYSTOLIC BLOOD PRESSURE: 116 MMHG | WEIGHT: 103.2 LBS | HEIGHT: 65 IN | HEART RATE: 66 BPM | BODY MASS INDEX: 17.19 KG/M2 | OXYGEN SATURATION: 100 % | DIASTOLIC BLOOD PRESSURE: 73 MMHG

## 2025-06-19 DIAGNOSIS — R76.8 ANTINUCLEAR ANTIBODY (ANA) POSITIVE: ICD-10-CM

## 2025-06-19 DIAGNOSIS — I73.81 ERYTHROMELALGIA: Primary | ICD-10-CM

## 2025-06-19 PROCEDURE — 1036F TOBACCO NON-USER: CPT | Performed by: STUDENT IN AN ORGANIZED HEALTH CARE EDUCATION/TRAINING PROGRAM

## 2025-06-19 PROCEDURE — 1159F MED LIST DOCD IN RCRD: CPT | Performed by: STUDENT IN AN ORGANIZED HEALTH CARE EDUCATION/TRAINING PROGRAM

## 2025-06-19 PROCEDURE — 99204 OFFICE O/P NEW MOD 45 MIN: CPT | Performed by: STUDENT IN AN ORGANIZED HEALTH CARE EDUCATION/TRAINING PROGRAM

## 2025-06-19 RX ORDER — UREA 200 MG/G
CREAM TOPICAL
COMMUNITY
Start: 2025-04-04

## 2025-06-19 NOTE — PATIENT INSTRUCTIONS
I would encourage you to get checked for osteoporosis by your primary care doctor, and treated if you have it due to high morbidity hip fractures     I think you have erythromelalgia    Lifestyle things you can try: capsaicin cream,elevating the area, using a fan, wrapping cool first aid gel pcaks in a thin towel and placing them on your skin    If you want to do medication management: You can see vascular medicine; sometimes they use aspirin, nerve medications, antidepressants; I have placed a referral, but if you do not want to use medications you do not need to see them    Your +CHELLY is not significant    You can followup as needed

## 2025-06-19 NOTE — PROGRESS NOTES
"Subjective   Patient ID: Jessica Hensley is a 85 y.o. female who presents for New Patient Visit (Osteoarthritis-both hands/+CHELLY/Referred by Dr. Morris).  HPI:    Female with OA, hearing loss, foot pain, osteoporosis has been on fosamax in the past,  turning purple, toe deformities, referred for arthralgia and positive CHELLY    Patients toes turn different colors since she was 60; She feels her skin and they feel dry ; They feel hot sometimes.At night, patient monitors it more. Symptoms do not get worse in the cold    Labs:  2024: WBCnormal, Hgb normal,platelets normal  Cr normal, ALP normal, AST normal, ALT normal, albumin normal,protein normal    Serologies:   CHELLY positive  Positive CHELLY but neg DIANA including dsdna, Sm, RNP, Sm/RNP, SSA, SSB, Scl-70, centromere, Pretty-1, chromatin, ribosomal p    2025: ABIs  Right Lower PVR: No evidence of arterial occlusive disease in the right lower extremity at rest. Multiphasic flow is noted in the right common femoral artery, right posterior tibial artery and right dorsalis pedis artery.  Left Lower PVR: No evidence of arterial occlusive disease in the left lower extremity at rest. Multiphasic flow is noted in the left common femoral artery, left posterior tibial artery and left dorsalis pedis artery.    Rheumatology specific review of systems   joint pain, morning stiffness>1 hr, fevers , chills, unintentional weight loss, rashes, alopecia, mouth sores, nasal ulcers, photosensitivity, Raynauds, morning stiffness in back, dry eyes, dry mouth, blood clots, recurrent, miscarriages, rheum fam hx, uveitis, blood or mucus in stool   Objective   /73 (BP Location: Left arm, Patient Position: Sitting, BP Cuff Size: Adult)   Pulse 66   Ht 1.651 m (5' 5\")   Wt 46.8 kg (103 lb 3.2 oz)   SpO2 100%   BMI 17.17 kg/m²       Physical Exam  Constitutional: Alert and in no acute distress. Well developed, well nourished  Head and Face: Head and face: Normal.    Cardiovascular: Heart " rate and rhythm were normal, normal S1 and S2. No peripheral edema.   Pulmonary: No respiratory distress. Clear bilateral breath sounds.  Musculoskeletal: MCP hypertrophy, heberden and manav nodes           Lab Results   Component Value Date    WBC 6.1 09/17/2024    HGB 13.6 09/17/2024    HCT 41.3 09/17/2024     09/17/2024    ALT 15 03/27/2025    AST 23 03/27/2025    CREATININE 0.69 05/01/2025          Lab Results   Component Value Date    ANAPATTRN Nuclear, Multiple Nuclear Dots (A) 03/27/2025    ANAPATTRN Nuclear, Speckled (A) 03/27/2025    ANATITER > OR = 1:1280 (A) 03/27/2025    ANATITER 1:80 (H) 03/27/2025    CHELLY POSITIVE (A) 03/27/2025    ASSB <1.0 NEG 03/27/2025    ANTIRIBO <1.0 NEG 03/27/2025    ACEN <1.0 NEG 03/27/2025    CRP <3.0 03/27/2025   \\            There is currently no information documented on the homunculus. Go to the Rheumatology activity and complete the homunculus joint exam.      Vascular US ankle brachial index (AVELINA) without exercise               [place] [id number]   Tel [phone] and Fax [phone]       Vascular Lab Report  [facility] US ANKLE BRACHIAL INDEX (AVELINA) WITHOUT EXERCISE       Patient Name:      CHELSEA Victoria Physician: [id number] Skylar Mane MD  Study Date:        4/14/2025           Ordering           [id number] VICK BRASHER                                         Physician:         ANAI  MRN/PID:           [id number]            Technologist:      Jasmina Azul T  Accession#:        [id number]        Technologist 2:  Date of Birth/Age: 1940 / 85      Encounter#:        [id number]                     years  Gender:            F  Admission Status:  Outpatient          Location           [facility]                                         Performed:       Diagnosis/ICD: Peripheral vascular disease, unspecified-I73.9  Indication:    Claudication  CPT Codes:     77161 Peripheral artery AVELINA Only        CONCLUSIONS:  Right Lower PVR: No evidence of arterial occlusive disease in the right lower extremity at rest. Multiphasic flow is noted in the right common femoral artery, right posterior tibial artery and right dorsalis pedis artery.  Left Lower PVR: No evidence of arterial occlusive disease in the left lower extremity at rest. Multiphasic flow is noted in the left common femoral artery, left posterior tibial artery and left dorsalis pedis artery.     Imaging & Doppler Findings:     RIGHT Lower PVR                Pressures Ratios  Right Posterior Tibial (Ankle) 150 mmHg  1.08  Right Dorsalis Pedis (Ankle)   147 mmHg  1.06          LEFT Lower PVR                Pressures Ratios  Left Posterior Tibial (Ankle) 133 mmHg  0.96  Left Dorsalis Pedis (Ankle)   139 mmHg  1.00                          Right     Left  Brachial Pressure 139 mmHg 135 mmHg          [id number] Skylar Mane MD  Electronically signed by [id number] Skylar Mane MD on 4/14/2025 at 4:23:09 PM       ** Final **          Assessment/Plan:  #+CHELLY  -DIANA neg  - Positive CHELLY can be a sign of an autoimmune disease.  Only about 11 to 13% of people with a positive CHELLY test have lupus.  Up to 15% of completely healthy people have a positive CHELLY test.  So an CHELLY test in and of itself, does not confirm any diagnosis of an autoimmune disease.    At this time, patient is not displaying any clear signs of a systemic rheumatologic disease.    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  Referred June 2025    As an aside, patient has a history of osteoporosis but no recent bone density.  She has not gotten  another bone density with her primary care doctor, because patient states she would reject medication management.  Strongly encouraged her to get another bone density per her primary care doctor, and to get treated with her primary care doctor if needed.  I can also assist with treatment of osteoporosis, if patient and primary care doctor would like.We discussed major morbidity of declining osteoporosis treatment. We discussed that up to one in three adults aged 50 and over dies within 12 months of suffering a hip fracture. Older adults have up to a five-to-eight times higher risk of dying within the first three months of a hip fracture compared to those without a hip fracture.    Patient counseled to seek medical care if any new or worsening symptoms, urgently if needed.      Note will be sent to primary care doctor.    Return to clinic PRN    Total time on this day of visit includes record and documentation review before and after visit including documentation and time not explicitly included on EMR time stamp.     Dragon dictation software was used to dictate this note. Errors may have occurred during dictation that was not intended by the user.

## 2025-09-18 ENCOUNTER — APPOINTMENT (OUTPATIENT)
Dept: DERMATOLOGY | Facility: CLINIC | Age: 85
End: 2025-09-18
Payer: MEDICARE

## 2025-09-30 ENCOUNTER — APPOINTMENT (OUTPATIENT)
Dept: PRIMARY CARE | Facility: CLINIC | Age: 85
End: 2025-09-30
Payer: MEDICARE

## 2025-11-04 ENCOUNTER — APPOINTMENT (OUTPATIENT)
Dept: PRIMARY CARE | Facility: CLINIC | Age: 85
End: 2025-11-04
Payer: COMMERCIAL